# Patient Record
Sex: FEMALE | Race: WHITE | NOT HISPANIC OR LATINO | URBAN - METROPOLITAN AREA
[De-identification: names, ages, dates, MRNs, and addresses within clinical notes are randomized per-mention and may not be internally consistent; named-entity substitution may affect disease eponyms.]

---

## 2018-06-20 ENCOUNTER — OFFICE (OUTPATIENT)
Dept: URBAN - METROPOLITAN AREA CLINIC 11 | Facility: CLINIC | Age: 78
End: 2018-06-20

## 2018-06-20 VITALS
DIASTOLIC BLOOD PRESSURE: 65 MMHG | HEART RATE: 100 BPM | SYSTOLIC BLOOD PRESSURE: 151 MMHG | WEIGHT: 137 LBS | HEIGHT: 62 IN

## 2018-06-20 DIAGNOSIS — I63.9 CEREBRAL INFARCTION, UNSPECIFIED: ICD-10-CM

## 2018-06-20 DIAGNOSIS — I10 ESSENTIAL (PRIMARY) HYPERTENSION: ICD-10-CM

## 2018-06-20 DIAGNOSIS — D64.9 ANEMIA, UNSPECIFIED: ICD-10-CM

## 2018-06-20 DIAGNOSIS — I25.10 ATHEROSCLEROTIC HEART DISEASE OF NATIVE CORONARY ARTERY WITH: ICD-10-CM

## 2018-06-20 DIAGNOSIS — R19.5 OTHER FECAL ABNORMALITIES: ICD-10-CM

## 2018-06-20 LAB
ANEMIA PROFILE B: BASO (ABSOLUTE): 0 X10E3/UL (ref 0–0.2)
ANEMIA PROFILE B: BASOS: 0 %
ANEMIA PROFILE B: EOS (ABSOLUTE): 0.1 X10E3/UL (ref 0–0.4)
ANEMIA PROFILE B: EOS: 1 %
ANEMIA PROFILE B: FERRITIN, SERUM: 9 NG/ML — LOW (ref 15–150)
ANEMIA PROFILE B: FOLATE (FOLIC ACID), SERUM: >20 NG/ML
ANEMIA PROFILE B: HEMATOCRIT: 26.8 % — LOW (ref 34–46.6)
ANEMIA PROFILE B: HEMOGLOBIN: 8 G/DL — LOW (ref 11.1–15.9)
ANEMIA PROFILE B: IMMATURE GRANS (ABS): 0 X10E3/UL (ref 0–0.1)
ANEMIA PROFILE B: IMMATURE GRANULOCYTES: 0 %
ANEMIA PROFILE B: IRON BIND.CAP.(TIBC): 432 UG/DL (ref 250–450)
ANEMIA PROFILE B: IRON SATURATION: 5 % — CRITICAL LOW (ref 15–55)
ANEMIA PROFILE B: IRON: 22 UG/DL — LOW (ref 27–139)
ANEMIA PROFILE B: LYMPHS (ABSOLUTE): 2.2 X10E3/UL (ref 0.7–3.1)
ANEMIA PROFILE B: LYMPHS: 28 %
ANEMIA PROFILE B: MCH: 24.1 PG — LOW (ref 26.6–33)
ANEMIA PROFILE B: MCHC: 29.9 G/DL — LOW (ref 31.5–35.7)
ANEMIA PROFILE B: MCV: 81 FL (ref 79–97)
ANEMIA PROFILE B: MONOCYTES(ABSOLUTE): 0.5 X10E3/UL (ref 0.1–0.9)
ANEMIA PROFILE B: MONOCYTES: 6 %
ANEMIA PROFILE B: NEUTROPHILS (ABSOLUTE): 5 X10E3/UL (ref 1.4–7)
ANEMIA PROFILE B: NEUTROPHILS: 65 %
ANEMIA PROFILE B: PLATELETS: 307 X10E3/UL (ref 150–379)
ANEMIA PROFILE B: RBC: 3.32 X10E6/UL — LOW (ref 3.77–5.28)
ANEMIA PROFILE B: RDW: 15.2 % (ref 12.3–15.4)
ANEMIA PROFILE B: RETICULOCYTE COUNT: 1.5 % (ref 0.6–2.6)
ANEMIA PROFILE B: UIBC: 410 UG/DL — HIGH (ref 118–369)
ANEMIA PROFILE B: VITAMIN B12: 607 PG/ML (ref 232–1245)
ANEMIA PROFILE B: WBC: 7.8 X10E3/UL (ref 3.4–10.8)

## 2018-06-20 PROCEDURE — 99204 OFFICE O/P NEW MOD 45 MIN: CPT | Performed by: INTERNAL MEDICINE

## 2018-06-20 RX ORDER — SODIUM PICOSULFATE, MAGNESIUM OXIDE, AND ANHYDROUS CITRIC ACID 10; 3.5; 12 MG/160ML; G/160ML; G/160ML
LIQUID ORAL
Qty: 1 | Refills: 0 | Status: COMPLETED
Start: 2018-06-20 | End: 2018-07-23

## 2018-06-20 NOTE — SERVICEHPINOTES
Ms. Shore is a 77-year-old female who is here for evaluation of anemia. It appears that she has had a progressive drop in her blood count over the past year. According to the medical records her hemoglobin and hematocrit in January 2017 were 13 and 40.8. She then had a CBC in March 2018 which revealed a drop with hemoglobin 9.8 and hematocrit 30.9. She did have blood work by her cardiologist just a couple of weeks ago with hemoglobin 8.4 and hematocrit 27.6. CMP was normal. Patient denies any rectal bleeding or dark bowel movements. She does take Plavix because of a history of a stroke two years ago. She states that her last colonoscopy was over 10 years ago but does not know where. She has never had an EGD. She states that she used to take NSAIDs but has not taken them for several months. She now only takes Tylenol. She denies any issues with abdominal pain, nausea, vomiting, dysphagia, diarrhea, etc. She does have some mild constipation which is well controlled. She also has some occasional reflux symptoms. She denies any issues with weight loss. She does have some increased fatigue and notes that she gets out of breath easier for than normal.

## 2018-06-20 NOTE — SERVICENOTES
The patient has microcytic anemia in the setting of occult blood positive stool.  She has not had a colonoscopy in over 10 years.  Because of her worsening anemia in the setting I do think it is reasonable that she undergo both EGD and colonoscopy.  Though she is 77 years old, she does appear to be in good health.  We will need to get cardiac clearance in order to stop her Plavix for the procedures.  The patient states that she will be leaving in early July for a two week trip but would like to proceed with procedures upon returning.  She should notify us or go to the ED if having worse weakness or other symptoms of worsening anemia or signs of overt blood loss.  If blood work does reveal iron deficiency that I do recommend starting iron supplementation.

## 2018-07-23 ENCOUNTER — OFFICE (OUTPATIENT)
Dept: URBAN - METROPOLITAN AREA PATHOLOGY 22 | Facility: PATHOLOGY | Age: 78
End: 2018-07-23

## 2018-07-23 ENCOUNTER — AMBULATORY SURGICAL CENTER (OUTPATIENT)
Dept: URBAN - METROPOLITAN AREA SURGERY 3 | Facility: SURGERY | Age: 78
End: 2018-07-23
Payer: MEDICARE

## 2018-07-23 ENCOUNTER — AMBULATORY SURGICAL CENTER (OUTPATIENT)
Dept: URBAN - METROPOLITAN AREA SURGERY 3 | Facility: SURGERY | Age: 78
End: 2018-07-23

## 2018-07-23 VITALS
RESPIRATION RATE: 15 BRPM | DIASTOLIC BLOOD PRESSURE: 78 MMHG | HEART RATE: 88 BPM | RESPIRATION RATE: 12 BRPM | HEART RATE: 93 BPM | HEART RATE: 82 BPM | DIASTOLIC BLOOD PRESSURE: 60 MMHG | DIASTOLIC BLOOD PRESSURE: 77 MMHG | WEIGHT: 145 LBS | HEART RATE: 86 BPM | SYSTOLIC BLOOD PRESSURE: 130 MMHG | SYSTOLIC BLOOD PRESSURE: 147 MMHG | OXYGEN SATURATION: 100 % | HEART RATE: 82 BPM | SYSTOLIC BLOOD PRESSURE: 140 MMHG | SYSTOLIC BLOOD PRESSURE: 147 MMHG | DIASTOLIC BLOOD PRESSURE: 60 MMHG | DIASTOLIC BLOOD PRESSURE: 66 MMHG | SYSTOLIC BLOOD PRESSURE: 140 MMHG | RESPIRATION RATE: 19 BRPM | SYSTOLIC BLOOD PRESSURE: 127 MMHG | WEIGHT: 145 LBS | RESPIRATION RATE: 12 BRPM | HEIGHT: 62 IN | DIASTOLIC BLOOD PRESSURE: 59 MMHG | DIASTOLIC BLOOD PRESSURE: 66 MMHG | HEART RATE: 86 BPM | TEMPERATURE: 97.6 F | TEMPERATURE: 97.2 F | DIASTOLIC BLOOD PRESSURE: 59 MMHG | SYSTOLIC BLOOD PRESSURE: 145 MMHG | HEART RATE: 93 BPM | DIASTOLIC BLOOD PRESSURE: 77 MMHG | DIASTOLIC BLOOD PRESSURE: 78 MMHG | SYSTOLIC BLOOD PRESSURE: 130 MMHG | HEART RATE: 88 BPM | HEIGHT: 62 IN | SYSTOLIC BLOOD PRESSURE: 127 MMHG | OXYGEN SATURATION: 99 % | OXYGEN SATURATION: 99 % | RESPIRATION RATE: 15 BRPM | TEMPERATURE: 97.2 F | TEMPERATURE: 97.6 F | SYSTOLIC BLOOD PRESSURE: 145 MMHG | OXYGEN SATURATION: 100 % | RESPIRATION RATE: 19 BRPM

## 2018-07-23 DIAGNOSIS — C18.0 MALIGNANT NEOPLASM OF CECUM: ICD-10-CM

## 2018-07-23 DIAGNOSIS — K56.609 UNSPECIFIED INTESTINAL OBSTRUCTION, UNSPECIFIED AS TO PARTIA: ICD-10-CM

## 2018-07-23 DIAGNOSIS — K92.2 GASTROINTESTINAL HEMORRHAGE, UNSPECIFIED: ICD-10-CM

## 2018-07-23 DIAGNOSIS — D50.9 IRON DEFICIENCY ANEMIA, UNSPECIFIED: ICD-10-CM

## 2018-07-23 DIAGNOSIS — K22.2 ESOPHAGEAL OBSTRUCTION: ICD-10-CM

## 2018-07-23 DIAGNOSIS — K57.30 DIVERTICULOSIS OF LARGE INTESTINE WITHOUT PERFORATION OR ABS: ICD-10-CM

## 2018-07-23 DIAGNOSIS — D12.3 BENIGN NEOPLASM OF TRANSVERSE COLON: ICD-10-CM

## 2018-07-23 DIAGNOSIS — K29.50 UNSPECIFIED CHRONIC GASTRITIS WITHOUT BLEEDING: ICD-10-CM

## 2018-07-23 PROCEDURE — 88342 IMHCHEM/IMCYTCHM 1ST ANTB: CPT | Performed by: INTERNAL MEDICINE

## 2018-07-23 PROCEDURE — 43239 EGD BIOPSY SINGLE/MULTIPLE: CPT | Mod: 59 | Performed by: INTERNAL MEDICINE

## 2018-07-23 PROCEDURE — 43249 ESOPH EGD DILATION <30 MM: CPT | Mod: 51 | Performed by: INTERNAL MEDICINE

## 2018-07-23 PROCEDURE — 45381 COLONOSCOPY SUBMUCOUS NJX: CPT | Mod: 59 | Performed by: INTERNAL MEDICINE

## 2018-07-23 PROCEDURE — 88341 IMHCHEM/IMCYTCHM EA ADD ANTB: CPT | Performed by: INTERNAL MEDICINE

## 2018-07-23 PROCEDURE — 88305 TISSUE EXAM BY PATHOLOGIST: CPT | Performed by: INTERNAL MEDICINE

## 2018-07-23 PROCEDURE — 45380 COLONOSCOPY AND BIOPSY: CPT | Performed by: INTERNAL MEDICINE

## 2018-07-23 PROCEDURE — G8918 PT W/O PREOP ORDER IV AB PRO: HCPCS | Performed by: INTERNAL MEDICINE

## 2018-07-23 PROCEDURE — G8907 PT DOC NO EVENTS ON DISCHARG: HCPCS | Performed by: INTERNAL MEDICINE

## 2018-07-23 PROCEDURE — 88313 SPECIAL STAINS GROUP 2: CPT | Performed by: INTERNAL MEDICINE

## 2018-07-24 PROBLEM — K57.30 DVRTCLOS OF LG INT W/O PERFORATION OR ABSCESS W/O BLEEDING: Status: ACTIVE | Noted: 2018-07-23

## 2018-07-24 PROBLEM — K56.609 UNSP INTESTNL OBST, UNSP AS TO PARTIAL VERSUS COMPLETE OBST: Status: ACTIVE | Noted: 2018-07-23

## 2018-07-24 PROBLEM — K92.2 EVALUATION OF UNEXPLAINED GI BLEEDING: Status: ACTIVE | Noted: 2018-07-23

## 2018-07-24 PROBLEM — C18.0 MALIGNANT NEOPLASM OF CECUM: Status: ACTIVE | Noted: 2018-07-23

## 2018-07-24 PROBLEM — D53.9 UNEXPLAINED IRON DEFICIENCY ANEMIA: Status: ACTIVE | Noted: 2018-07-23

## 2018-07-24 PROBLEM — K22.2 ESOPHAGEAL OBSTRUCTION: Status: ACTIVE | Noted: 2018-07-23

## 2018-07-24 PROBLEM — D12.3 BENIGN NEOPLASM OF TRANSVERSE COLON: Status: ACTIVE | Noted: 2018-07-23

## 2018-07-24 PROBLEM — K31.89 OTHER DISEASES OF STOMACH AND DUODENUM: Status: ACTIVE | Noted: 2018-07-23
